# Patient Record
Sex: FEMALE | URBAN - METROPOLITAN AREA
[De-identification: names, ages, dates, MRNs, and addresses within clinical notes are randomized per-mention and may not be internally consistent; named-entity substitution may affect disease eponyms.]

---

## 2021-04-13 ENCOUNTER — APPOINTMENT (RX ONLY)
Dept: URBAN - METROPOLITAN AREA CLINIC 134 | Facility: CLINIC | Age: 31
Setting detail: DERMATOLOGY
End: 2021-04-13

## 2021-04-13 VITALS — TEMPERATURE: 97.16 F

## 2021-04-13 DIAGNOSIS — L85.3 XEROSIS CUTIS: ICD-10-CM

## 2021-04-13 DIAGNOSIS — L98.8 OTHER SPECIFIED DISORDERS OF THE SKIN AND SUBCUTANEOUS TISSUE: ICD-10-CM

## 2021-04-13 PROCEDURE — ? COUNSELING

## 2021-04-13 PROCEDURE — ? TREATMENT REGIMEN

## 2021-04-13 PROCEDURE — 99202 OFFICE O/P NEW SF 15 MIN: CPT

## 2021-04-13 ASSESSMENT — LOCATION DETAILED DESCRIPTION DERM
LOCATION DETAILED: LEFT ANTERIOR MEDIAL PROXIMAL UPPER ARM
LOCATION DETAILED: RIGHT MEDIAL FOREHEAD

## 2021-04-13 ASSESSMENT — LOCATION SIMPLE DESCRIPTION DERM
LOCATION SIMPLE: LEFT UPPER ARM
LOCATION SIMPLE: RIGHT FOREHEAD

## 2021-04-13 ASSESSMENT — LOCATION ZONE DERM
LOCATION ZONE: ARM
LOCATION ZONE: FACE

## 2021-04-13 NOTE — PROCEDURE: TREATMENT REGIMEN
Initiate Treatment: C-Quester \\nZinc based sunscreen daily, Elta Clear, or Elta Daily
Detail Level: Zone
Initiate Treatment: VC bar to odorous areas only \\nBody butter mixed 1:1 with coconut oil BID\\nEletone* apply over body butter BID
Plan: She would need somewhere in the [place] of 16-20 units of Botox to her forehead and crows feet area.  She understands that she may need more depending on her movement remaining.
Plan: She has some pretty brisk chaffing on her breast and so she understands that we need to make changes quickly
Samples Given: Eletone

## 2021-04-13 NOTE — HPI: RASH
How Severe Is Your Rash?: mild
Is This A New Presentation, Or A Follow-Up?: Rash
Additional History: She has tried to treat with exfoliants, moisturizers, several otc products no prescriptions.  She is washing with philosophy soap and a loofa and body butter

## 2021-06-04 ENCOUNTER — APPOINTMENT (RX ONLY)
Dept: URBAN - METROPOLITAN AREA CLINIC 134 | Facility: CLINIC | Age: 31
Setting detail: DERMATOLOGY
End: 2021-06-04

## 2021-06-04 VITALS — TEMPERATURE: 97.52 F

## 2021-06-04 DIAGNOSIS — L98.8 OTHER SPECIFIED DISORDERS OF THE SKIN AND SUBCUTANEOUS TISSUE: ICD-10-CM

## 2021-06-04 PROCEDURE — ? BOTOX

## 2021-06-04 NOTE — PROCEDURE: BOTOX
Total Units: 10
Post-Care Instructions: Patient instructed to not lie down for 4 hours and limit physical activity for 24 hours.
Consent: Written consent obtained. Risks include but not limited to lid/brow ptosis, bruising, swelling, diplopia, temporary effect, incomplete chemical denervation.
Price (Use Numbers Only, No Special Characters Or $): 130
Lot #: [id number]
Detail Level: Detailed
Expiration Date (Month Year): 08/23